# Patient Record
Sex: MALE | Race: WHITE
[De-identification: names, ages, dates, MRNs, and addresses within clinical notes are randomized per-mention and may not be internally consistent; named-entity substitution may affect disease eponyms.]

---

## 2018-07-20 ENCOUNTER — HOSPITAL ENCOUNTER (EMERGENCY)
Dept: HOSPITAL 88 - ER | Age: 63
Discharge: HOME | End: 2018-07-20
Payer: COMMERCIAL

## 2018-07-20 VITALS — BODY MASS INDEX: 24.65 KG/M2 | HEIGHT: 72 IN | WEIGHT: 182 LBS

## 2018-07-20 DIAGNOSIS — I10: ICD-10-CM

## 2018-07-20 DIAGNOSIS — M79.671: Primary | ICD-10-CM

## 2018-07-20 DIAGNOSIS — J44.9: ICD-10-CM

## 2018-07-20 DIAGNOSIS — Z91.5: ICD-10-CM

## 2018-07-20 DIAGNOSIS — F17.210: ICD-10-CM

## 2018-07-20 DIAGNOSIS — M13.871: ICD-10-CM

## 2018-07-20 PROCEDURE — 99282 EMERGENCY DEPT VISIT SF MDM: CPT

## 2018-07-20 NOTE — DIAGNOSTIC IMAGING REPORT
FOOT RIGHT COMPLETE



HISTORY:  Heel pain.



COMPARISON: None

     

FINDINGS:

Bones:

No displaced fracture.  

Osseous alignment is within normal limits.



Joints:

Mild degenerative changes of the midfoot.



Soft tissues:

The soft tissues appear unremarkable.





IMPRESSION: 

No acute radiographic abnormality.



Signed by: Dr. Mk Zavaleta M.D. on 7/20/2018 3:31 AM